# Patient Record
Sex: MALE | URBAN - METROPOLITAN AREA
[De-identification: names, ages, dates, MRNs, and addresses within clinical notes are randomized per-mention and may not be internally consistent; named-entity substitution may affect disease eponyms.]

---

## 2019-10-31 ENCOUNTER — OFFICE VISIT (OUTPATIENT)
Dept: URBAN - METROPOLITAN AREA CLINIC 22 | Facility: CLINIC | Age: 24
End: 2019-10-31
Payer: COMMERCIAL

## 2019-10-31 DIAGNOSIS — H52.13 MYOPIA, BILATERAL: Primary | ICD-10-CM

## 2019-10-31 PROCEDURE — 92002 INTRM OPH EXAM NEW PATIENT: CPT | Performed by: OPTOMETRIST

## 2019-10-31 ASSESSMENT — VISUAL ACUITY
OD: 20/20
OS: 20/20

## 2019-10-31 ASSESSMENT — INTRAOCULAR PRESSURE
OD: 18
OS: 19

## 2019-10-31 NOTE — IMPRESSION/PLAN
Impression: Myopia, bilateral: H52.13. OU. Plan: Refractive error accounts for symptoms. Release SRX. Mild, only needed if vision bothering pt. All ocular structures appear healthy today, OU. RTC 1 year x CEE.